# Patient Record
Sex: FEMALE | Race: WHITE | NOT HISPANIC OR LATINO | URBAN - METROPOLITAN AREA
[De-identification: names, ages, dates, MRNs, and addresses within clinical notes are randomized per-mention and may not be internally consistent; named-entity substitution may affect disease eponyms.]

---

## 2023-09-05 ENCOUNTER — OFFICE VISIT (OUTPATIENT)
Dept: URBAN - METROPOLITAN AREA CLINIC 27 | Facility: CLINIC | Age: 68
End: 2023-09-05
Payer: MEDICARE

## 2023-09-05 ENCOUNTER — DASHBOARD ENCOUNTERS (OUTPATIENT)
Age: 68
End: 2023-09-05

## 2023-09-05 VITALS
SYSTOLIC BLOOD PRESSURE: 126 MMHG | WEIGHT: 183 LBS | HEART RATE: 76 BPM | BODY MASS INDEX: 33.68 KG/M2 | HEIGHT: 62 IN | DIASTOLIC BLOOD PRESSURE: 68 MMHG

## 2023-09-05 DIAGNOSIS — Z80.0 FAMILY HISTORY OF COLON CANCER: ICD-10-CM

## 2023-09-05 DIAGNOSIS — Z12.11 COLON CANCER SCREENING: ICD-10-CM

## 2023-09-05 DIAGNOSIS — K62.5 RECTAL BLEEDING: ICD-10-CM

## 2023-09-05 DIAGNOSIS — R10.30 LOWER ABDOMINAL PAIN: ICD-10-CM

## 2023-09-05 PROBLEM — 12063002: Status: ACTIVE | Noted: 2023-09-05

## 2023-09-05 PROBLEM — 54586004: Status: ACTIVE | Noted: 2023-09-05

## 2023-09-05 PROBLEM — 312824007: Status: ACTIVE | Noted: 2023-09-05

## 2023-09-05 PROBLEM — 305058001: Status: ACTIVE | Noted: 2023-09-05

## 2023-09-05 PROCEDURE — 99204 OFFICE O/P NEW MOD 45 MIN: CPT | Performed by: PHYSICIAN ASSISTANT

## 2023-09-05 RX ORDER — DICYCLOMINE HYDROCHLORIDE 20 MG/1
1/2 OR 1 TABLET TABLET ORAL THREE TIMES A DAY
Qty: 30 | Refills: 1 | OUTPATIENT
Start: 2023-09-05 | End: 2023-11-04

## 2023-09-05 NOTE — HPI-ZZZTODAY'S VISIT
Ms. Garzon is a 68-year-old female new patient presenting for evaluation of rectal bleeding with Dr. Chiang. She has had 2 discrete episodes (6 wks ago, 2 wks ago) of severe abdominal cramping followed by maroon blood in her stool. No pain or bleeding at present. No associated fevers or vomiting. Stools are chronically loose, no change in baseline consistency/pattern. She has lost 15 lbs since initial episode which she thinks is because she has been afraid to eat. Her last colonoscopy was 10-20 yrs ago with Dr. Martinez, does not remember if any polyps were removed.  Her  is a patient of Dr. Chiang. . FH: father with CRC; paternal aunt, paternal grandmother all with CRC

## 2023-09-28 ENCOUNTER — ERX REFILL RESPONSE (OUTPATIENT)
Dept: URBAN - METROPOLITAN AREA CLINIC 27 | Facility: CLINIC | Age: 68
End: 2023-09-28

## 2023-09-28 RX ORDER — DICYCLOMINE HYDROCHLORIDE 20 MG/1
TAKE HALF OR 1 TABLET BY MOUTH THREE TIMES A DAY TABLET ORAL
Qty: 30 TABLET | Refills: 1 | OUTPATIENT

## 2023-09-28 RX ORDER — DICYCLOMINE HYDROCHLORIDE 20 MG/1
1/2 OR 1 TABLET TABLET ORAL THREE TIMES A DAY
Qty: 30 | Refills: 1 | OUTPATIENT

## 2023-10-10 ENCOUNTER — OFFICE VISIT (OUTPATIENT)
Dept: URBAN - METROPOLITAN AREA SURGERY CENTER 7 | Facility: SURGERY CENTER | Age: 68
End: 2023-10-10
Payer: MEDICARE

## 2023-10-10 ENCOUNTER — CLAIMS CREATED FROM THE CLAIM WINDOW (OUTPATIENT)
Dept: URBAN - METROPOLITAN AREA CLINIC 4 | Facility: CLINIC | Age: 68
End: 2023-10-10
Payer: MEDICARE

## 2023-10-10 DIAGNOSIS — K63.5 BENIGN COLON POLYP: ICD-10-CM

## 2023-10-10 DIAGNOSIS — Z12.11 COLON CANCER SCREENING: ICD-10-CM

## 2023-10-10 DIAGNOSIS — D12.2 ADENOMATOUS POLYP OF ASCENDING COLON: ICD-10-CM

## 2023-10-10 DIAGNOSIS — D12.8 ADENOMATOUS POLYP OF RECTUM: ICD-10-CM

## 2023-10-10 DIAGNOSIS — D12.2 ADENOMA OF ASCENDING COLON: ICD-10-CM

## 2023-10-10 DIAGNOSIS — D12.2 BENIGN NEOPLASM OF ASCENDING COLON: ICD-10-CM

## 2023-10-10 DIAGNOSIS — Z80.0 BROTHER AT YOUNG AGE FAMILY HISTORY OF COLON CANCER: ICD-10-CM

## 2023-10-10 DIAGNOSIS — Z12.11 COLON CANCER SCREENING (HIGH RISK): ICD-10-CM

## 2023-10-10 DIAGNOSIS — Z80.0 FAMILY HISTORY OF COLON CANCER: ICD-10-CM

## 2023-10-10 PROCEDURE — G8907 PT DOC NO EVENTS ON DISCHARG: HCPCS | Performed by: INTERNAL MEDICINE

## 2023-10-10 PROCEDURE — 45380 COLONOSCOPY AND BIOPSY: CPT | Performed by: INTERNAL MEDICINE

## 2023-10-10 PROCEDURE — 00811 ANES LWR INTST NDSC NOS: CPT | Performed by: NURSE ANESTHETIST, CERTIFIED REGISTERED

## 2023-10-10 PROCEDURE — 45384 COLONOSCOPY W/LESION REMOVAL: CPT | Performed by: INTERNAL MEDICINE

## 2023-10-10 PROCEDURE — 88305 TISSUE EXAM BY PATHOLOGIST: CPT | Performed by: PATHOLOGY

## 2023-10-10 RX ORDER — DICYCLOMINE HYDROCHLORIDE 20 MG/1
TAKE HALF OR 1 TABLET BY MOUTH THREE TIMES A DAY TABLET ORAL
Qty: 30 TABLET | Refills: 1 | Status: ACTIVE | COMMUNITY

## 2023-10-16 ENCOUNTER — ERX REFILL RESPONSE (OUTPATIENT)
Dept: URBAN - METROPOLITAN AREA CLINIC 27 | Facility: CLINIC | Age: 68
End: 2023-10-16

## 2023-10-16 RX ORDER — DICYCLOMINE HYDROCHLORIDE 20 MG/1
TAKE HALF OR 1 TABLET BY MOUTH THREE TIMES A DAY 30 TABLET ORAL
Qty: 90 TABLET | Refills: 1 | OUTPATIENT

## 2023-10-16 RX ORDER — DICYCLOMINE HYDROCHLORIDE 20 MG/1
TAKE HALF OR 1 TABLET BY MOUTH THREE TIMES A DAY TABLET ORAL
Qty: 30 TABLET | Refills: 1 | OUTPATIENT

## 2023-10-30 ENCOUNTER — ERX REFILL RESPONSE (OUTPATIENT)
Dept: URBAN - METROPOLITAN AREA CLINIC 27 | Facility: CLINIC | Age: 68
End: 2023-10-30

## 2023-10-30 RX ORDER — DICYCLOMINE HYDROCHLORIDE 20 MG/1
TAKE HALF OR 1 TABLET BY MOUTH THREE TIMES A DAY 30 TABLET ORAL
Qty: 270 TABLET | Refills: 1 | OUTPATIENT

## 2023-10-30 RX ORDER — DICYCLOMINE HYDROCHLORIDE 20 MG/1
TAKE HALF OR 1 TABLET BY MOUTH THREE TIMES A DAY 30 TABLET ORAL
Qty: 90 TABLET | Refills: 1 | OUTPATIENT